# Patient Record
Sex: FEMALE | Race: BLACK OR AFRICAN AMERICAN | NOT HISPANIC OR LATINO | Employment: PART TIME | URBAN - METROPOLITAN AREA
[De-identification: names, ages, dates, MRNs, and addresses within clinical notes are randomized per-mention and may not be internally consistent; named-entity substitution may affect disease eponyms.]

---

## 2017-04-14 ENCOUNTER — OFFICE VISIT (OUTPATIENT)
Dept: INTERNAL MEDICINE | Facility: CLINIC | Age: 27
End: 2017-04-14

## 2017-04-14 VITALS
DIASTOLIC BLOOD PRESSURE: 86 MMHG | OXYGEN SATURATION: 99 % | BODY MASS INDEX: 39.89 KG/M2 | HEART RATE: 78 BPM | SYSTOLIC BLOOD PRESSURE: 130 MMHG | WEIGHT: 286 LBS | RESPIRATION RATE: 18 BRPM

## 2017-04-14 DIAGNOSIS — J45.30 MILD PERSISTENT ASTHMA WITHOUT COMPLICATION: ICD-10-CM

## 2017-04-14 DIAGNOSIS — Z00.00 ROUTINE ADULT HEALTH MAINTENANCE: Primary | ICD-10-CM

## 2017-04-14 LAB
25(OH)D3+25(OH)D2 SERPL-MCNC: 24.3 NG/ML
ALBUMIN SERPL-MCNC: 4.1 G/DL (ref 3.5–5.2)
ALBUMIN/GLOB SERPL: 1.2 G/DL
ALP SERPL-CCNC: 65 U/L (ref 40–129)
ALT SERPL-CCNC: 15 U/L (ref 5–33)
AST SERPL-CCNC: 20 U/L (ref 5–32)
BASOPHILS # BLD AUTO: 0.03 10*3/MM3 (ref 0–0.2)
BASOPHILS NFR BLD AUTO: 0.6 % (ref 0–2)
BILIRUB SERPL-MCNC: 0.3 MG/DL (ref 0.2–1.2)
BUN SERPL-MCNC: 12 MG/DL (ref 6–20)
BUN/CREAT SERPL: 12.2 (ref 7–25)
CALCIUM SERPL-MCNC: 9.2 MG/DL (ref 8.6–10.5)
CHLORIDE SERPL-SCNC: 105 MMOL/L (ref 98–107)
CHOLEST SERPL-MCNC: 160 MG/DL (ref 0–200)
CHOLEST/HDLC SERPL: 3.64 {RATIO}
CO2 SERPL-SCNC: 23.1 MMOL/L (ref 22–29)
CREAT SERPL-MCNC: 0.98 MG/DL (ref 0.57–1)
EOSINOPHIL # BLD AUTO: 0.11 10*3/MM3 (ref 0.1–0.3)
EOSINOPHIL NFR BLD AUTO: 2.2 % (ref 0–4)
ERYTHROCYTE [DISTWIDTH] IN BLOOD BY AUTOMATED COUNT: 12.1 % (ref 11.5–14.5)
GLOBULIN SER CALC-MCNC: 3.3 GM/DL
GLUCOSE SERPL-MCNC: 87 MG/DL (ref 65–99)
HBA1C MFR BLD: 5.4 % (ref 4.8–5.6)
HCT VFR BLD AUTO: 42.4 % (ref 37–47)
HDLC SERPL-MCNC: 44 MG/DL (ref 40–60)
HGB BLD-MCNC: 14.3 G/DL (ref 12–16)
IMM GRANULOCYTES # BLD: 0.01 10*3/MM3 (ref 0–0.03)
IMM GRANULOCYTES NFR BLD: 0.2 % (ref 0–0.5)
LDLC SERPL CALC-MCNC: 108 MG/DL (ref 0–100)
LYMPHOCYTES # BLD AUTO: 1.66 10*3/MM3 (ref 0.6–4.8)
LYMPHOCYTES NFR BLD AUTO: 32.6 % (ref 20–45)
MCH RBC QN AUTO: 29.7 PG (ref 27–31)
MCHC RBC AUTO-ENTMCNC: 33.7 G/DL (ref 31–37)
MCV RBC AUTO: 88 FL (ref 81–99)
MONOCYTES # BLD AUTO: 0.49 10*3/MM3 (ref 0–1)
MONOCYTES NFR BLD AUTO: 9.6 % (ref 3–8)
NEUTROPHILS # BLD AUTO: 2.79 10*3/MM3 (ref 1.5–8.3)
NEUTROPHILS NFR BLD AUTO: 54.8 % (ref 45–70)
NRBC BLD AUTO-RTO: 0 /100 WBC (ref 0–0)
PLATELET # BLD AUTO: 281 10*3/MM3 (ref 140–500)
POTASSIUM SERPL-SCNC: 4.4 MMOL/L (ref 3.5–5.2)
PROT SERPL-MCNC: 7.4 G/DL (ref 6–8.5)
RBC # BLD AUTO: 4.82 10*6/MM3 (ref 4.2–5.4)
SODIUM SERPL-SCNC: 141 MMOL/L (ref 136–145)
TRIGL SERPL-MCNC: 40 MG/DL (ref 0–150)
TSH SERPL DL<=0.005 MIU/L-ACNC: 1.99 MIU/ML (ref 0.27–4.2)
VLDLC SERPL CALC-MCNC: 8 MG/DL (ref 7–27)
WBC # BLD AUTO: 5.09 10*3/MM3 (ref 4.8–10.8)

## 2017-04-14 PROCEDURE — 99395 PREV VISIT EST AGE 18-39: CPT | Performed by: INTERNAL MEDICINE

## 2017-04-14 PROCEDURE — 94010 BREATHING CAPACITY TEST: CPT | Performed by: INTERNAL MEDICINE

## 2017-04-14 PROCEDURE — 93000 ELECTROCARDIOGRAM COMPLETE: CPT | Performed by: INTERNAL MEDICINE

## 2017-04-14 RX ORDER — ALBUTEROL SULFATE 90 UG/1
2 AEROSOL, METERED RESPIRATORY (INHALATION)
COMMUNITY
Start: 2017-01-03

## 2017-04-14 RX ORDER — MONTELUKAST SODIUM 10 MG/1
10 TABLET ORAL
COMMUNITY

## 2017-04-14 RX ORDER — FEXOFENADINE HCL 180 MG/1
180 TABLET ORAL DAILY
COMMUNITY

## 2017-04-14 NOTE — PROGRESS NOTES
Procedure   Procedures      Pulmonary Function Test Interpretation  Dena Valencia  [id number]    04/14/17  9:12 AM    Indication asthma monitoring  FEV/FVC 76%  [id number]  FEV1 2.11  indicatoin asthma

## 2017-04-14 NOTE — PATIENT INSTRUCTIONS
Assessment/Plan   Dena was seen today for annual exam.    Diagnoses and all orders for this visit:    Routine adult health maintenance  -     CBC & Differential  -     Comprehensive Metabolic Panel  -     Hemoglobin A1c  -     Lipid Panel With / Chol / HDL Ratio  -     TSH  -     Vitamin D 25 hydroxy  -     CBC & Differential    Mild persistent asthma without complication  -     CBC & Differential  -     Comprehensive Metabolic Panel  -     Hemoglobin A1c  -     Lipid Panel With / Chol / HDL Ratio  -     TSH  -     Vitamin D 25 hydroxy  -     CBC & Differential      Pulmicort samples  2 puff daily and rinse

## 2017-04-14 NOTE — PROGRESS NOTES
Procedure   Procedures     Adult ECG Report     Name: Dena Valencia   Age: 26 y.o.   Gender: female       Rate: 58   Rhythm: sinus bradycardia   QRS Axis: normal   NJ Interval: 180   QRS Duration: 86   QTc: 375   Voltages: normal   Conduction Disturbances: none   Other Abnormalities: none     Narrative Interpretation: mild physiologic sinus bradycardia  Indication cpe, comparison none

## 2017-04-14 NOTE — PROGRESS NOTES
Subjective   Dena Valencia is a 26 y.o. female.     History of Present Illness   27 yo female with pmhx asthma.  Has underlying allergies.  She last took antibiotic and steroid in January.  She is doing well right now. Working out regularly.  She denies any cp or dyspnea.     The following portions of the patient's history were reviewed and updated as appropriate: allergies, current medications, past family history, past medical history, past social history, past surgical history and problem list.    Review of Systems   Constitutional: Negative.    HENT: Positive for congestion.    Eyes: Negative.    Respiratory: Negative.  Negative for cough, shortness of breath and wheezing.    Cardiovascular: Negative.    Gastrointestinal: Negative.         Daily stool   Endocrine: Negative.    Genitourinary: Negative.    Musculoskeletal: Positive for myalgias. Negative for arthralgias.   Skin:        One keloid. Some predisposition   Neurological: Negative.    Hematological: Negative.    All other systems reviewed and are negative.      Objective   Physical Exam   Constitutional: She is oriented to person, place, and time. She appears well-developed and well-nourished.   HENT:   Head: Normocephalic and atraumatic.   Right Ear: External ear normal.   Left Ear: External ear normal.   Mouth/Throat: Oropharynx is clear and moist.   Mallampati 3   Eyes: EOM are normal. Pupils are equal, round, and reactive to light.   Neck: Normal range of motion. Neck supple. No JVD present. No tracheal deviation present. No thyromegaly present.   Cardiovascular: Normal rate, regular rhythm, normal heart sounds and intact distal pulses.    No murmur heard.  Pulmonary/Chest: Effort normal and breath sounds normal. No respiratory distress. She has no wheezes.   Abdominal: Soft. She exhibits no distension and no mass. There is no tenderness.   Lymphadenopathy:     She has no cervical adenopathy.   Neurological: She is alert and oriented to person,  place, and time.   Skin: Skin is warm and dry.   Psychiatric: She has a normal mood and affect. Her behavior is normal. Judgment and thought content normal.   Vitals reviewed.  UA today  EKG NSR    Assessment/Plan   Dena was seen today for annual exam.    Diagnoses and all orders for this visit:    Routine adult health maintenance  -     CBC & Differential  -     Comprehensive Metabolic Panel  -     Hemoglobin A1c  -     Lipid Panel With / Chol / HDL Ratio  -     TSH  -     Vitamin D 25 hydroxy  -     CBC & Differential    Mild persistent asthma without complication  -     CBC & Differential  -     Comprehensive Metabolic Panel  -     Hemoglobin A1c  -     Lipid Panel With / Chol / HDL Ratio  -     TSH  -     Vitamin D 25 hydroxy  -     CBC & Differential      Pulmicort samples  2 puff daily and rinse

## 2017-05-22 ENCOUNTER — TELEPHONE (OUTPATIENT)
Dept: INTERNAL MEDICINE | Facility: CLINIC | Age: 27
End: 2017-05-22

## 2018-06-04 ENCOUNTER — HOSPITAL ENCOUNTER (EMERGENCY)
Facility: HOSPITAL | Age: 28
Discharge: HOME | End: 2018-06-04
Attending: EMERGENCY MEDICINE | Admitting: EMERGENCY MEDICINE

## 2018-06-04 VITALS
WEIGHT: 245 LBS | RESPIRATION RATE: 16 BRPM | DIASTOLIC BLOOD PRESSURE: 92 MMHG | SYSTOLIC BLOOD PRESSURE: 122 MMHG | HEIGHT: 71 IN | TEMPERATURE: 97.4 F | HEART RATE: 101 BPM | BODY MASS INDEX: 34.3 KG/M2 | OXYGEN SATURATION: 98 %

## 2018-06-04 DIAGNOSIS — L01.00 IMPETIGO: Primary | ICD-10-CM

## 2018-06-04 PROCEDURE — 99282 EMERGENCY DEPT VISIT SF MDM: CPT

## 2018-06-04 RX ORDER — MUPIROCIN CALCIUM 20 MG/G
CREAM TOPICAL 3 TIMES DAILY
Qty: 30 G | Refills: 0 | Status: SHIPPED | OUTPATIENT
Start: 2018-06-04

## 2018-06-04 NOTE — ED PROVIDER NOTES
" [facility] EMERGENCY DEPARTMENT ENCOUNTER    CHIEF COMPLAINT  Chief Complaint: Rash  History given by: Patient  History limited by: none  [facility] Room Number: 45/45  PMD: Solo Ferraro MD      HPI:  Pt is a 27 y.o. female who presents complaining of \"blistering\" rash to lumbar back that began 2-3 weeks ago and has been intermittent with treatment. Pt has hx of chicken pox and states \"I think I have shingles again\".  Pt has hx of chronic urticaria, but states this rash has continued recurring despite Benadryl and hydrocortisone treatment.     Onset: gradual  Duration: 2-3 week  Severity: mild  Associated symptoms: none  Previous treatment: Pt has been managing symptoms with benadryl and hydrocortisone treatment.     PAST MEDICAL HISTORY  Active Ambulatory Problems     Diagnosis Date Noted   • Routine adult health maintenance 04/14/2017   • Mild persistent asthma 04/14/2017   • History of tethered spinal cord      Resolved Ambulatory Problems     Diagnosis Date Noted   • No Resolved Ambulatory Problems     Past Medical History:   Diagnosis Date   • History of tethered spinal cord    • Mild persistent asthma 4/14/2017       PAST SURGICAL HISTORY  Past Surgical History:   Procedure Laterality Date   • LUMBAR LAMINECTOMY FOR TETHERED CORD RELEASE      Jaquan       FAMILY HISTORY  Family History   Problem Relation Age of Onset   • Anxiety disorder Mother    • Asthma Mother        SOCIAL HISTORY  Social History     Social History   • Marital status: Single     Spouse name: N/A   • Number of children: N/A   • Years of education: N/A     Occupational History   •       Social History Main Topics   • Smoking status: Never Smoker   • Smokeless tobacco: Not on file   • Alcohol use Yes      Comment: 2-3 month   • Drug use: No   • Sexual activity: Yes     Partners: Male      Comment: IUD     Other Topics Concern   • Not on file     Social History Narrative    Single but living with boyfriend    Trying to get in HR    Graduated " "from high school    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   HENT: Negative for sore throat.    Eyes: Negative.    Respiratory: Negative for cough and shortness of breath.    Cardiovascular: Negative for chest pain.   Gastrointestinal: Negative for abdominal pain, diarrhea and vomiting.   Genitourinary: Negative for dysuria.   Musculoskeletal: Negative for neck pain.   Skin: Positive for rash (\"blistering\").   Allergic/Immunologic: Negative.    Neurological: Negative for weakness, numbness and headaches.   Hematological: Negative.    Psychiatric/Behavioral: Negative.    All other systems reviewed and are negative.      PHYSICAL EXAM  ED Triage Vitals   Temp Heart Rate Resp BP SpO2   06/04/18 1347 06/04/18 1347 06/04/18 1347 06/04/18 1408 06/04/18 1347   97.4 °F (36.3 °C) 101 16 122/92 98 %      Temp src Heart Rate Source Patient Position BP Location FiO2 (%)   06/04/18 1347 06/04/18 1347 06/04/18 1408 -- --   Tympanic Monitor Lying         Physical Exam   Constitutional: She is oriented to person, place, and time. No distress.   Eyes: EOM are normal.   Neck: Normal range of motion.   Cardiovascular: Normal rate and regular rhythm.    Pulmonary/Chest: Effort normal and breath sounds normal. No respiratory distress.   Neurological: She is alert and oriented to person, place, and time. She has normal sensation and normal strength.   Skin: Skin is warm and dry. Rash noted.   To lumbar back - Dry skin with yellow crusting drainage, no vesicles, urticaria, or erythema. Consistent with impetigo.    Psychiatric: Affect normal.   Nursing note and vitals reviewed.    Procedures      PROGRESS AND CONSULTS     1418  Upon pt exam, discussed rash is due to impetigo, and rule out of shingles or allergic reaction. Directed pt to keep skin clean, with plan for discharge with abx Bactroban for further treatment. Pt " was directed to follow up with PCP in several days for recheck. Pt understands and agrees with plan, all questions addressed .      MEDICAL DECISION MAKING  Results were reviewed/discussed with the patient and they were also made aware of online access. Pt also made aware that some labs, such as cultures, will not be resulted during ER visit and follow up with PMD is necessary.     MDM  Number of Diagnoses or Management Options  Impetigo:          DIAGNOSIS  Final diagnoses:   Impetigo       DISPOSITION  DISCHARGE    Patient discharged in stable condition.    Reviewed implications of results, diagnosis, meds, responsibility to follow up, warning signs and symptoms of possible worsening, potential complications and reasons to return to ER.    Patient/Family voiced understanding of above instructions.    Discussed plan for discharge, as there is no emergent indication for admission. Patient referred to primary care provider for BP management due to today's BP. Pt/family is agreeable and understands need for follow up and repeat testing.  Pt is aware that discharge does not mean that nothing is wrong but it indicates no emergency is present that requires admission and they must continue care with follow-up as given below or physician of their choice.     FOLLOW-UP  Solo Ferraro MD  [address] [facility]  [address]  [place] [address]  [phone]    In 3 days  If symptoms worsen         Medication List      New Prescriptions    mupirocin 2 % cream  Commonly known as:  BACTROBAN  Apply  topically 3 (Three) Times a Day. Apply to back              Latest Documented Vital Signs:  As of 3:00 PM  BP- 122/92 HR- 101 Temp- 97.4 °F (36.3 °C) (Tympanic) O2 sat- 98%    --  Documentation assistance provided by jes Beckford for Dr. Gross.  Information recorded by the jes was done at my direction and has been verified and validated by me.       Luz Maria Beckford  06/04/18 [id number]       Jean Claude Gross MD  06/04/18 1870